# Patient Record
Sex: MALE
[De-identification: names, ages, dates, MRNs, and addresses within clinical notes are randomized per-mention and may not be internally consistent; named-entity substitution may affect disease eponyms.]

---

## 2018-01-31 ENCOUNTER — HOSPITAL ENCOUNTER (EMERGENCY)
Dept: HOSPITAL 41 - JD.ED | Age: 53
Discharge: HOME | End: 2018-01-31
Payer: COMMERCIAL

## 2018-01-31 DIAGNOSIS — I10: ICD-10-CM

## 2018-01-31 DIAGNOSIS — H33.21: Primary | ICD-10-CM

## 2018-01-31 DIAGNOSIS — E11.9: ICD-10-CM

## 2018-01-31 DIAGNOSIS — Z79.84: ICD-10-CM

## 2018-01-31 DIAGNOSIS — Z79.82: ICD-10-CM

## 2018-01-31 DIAGNOSIS — Z88.5: ICD-10-CM
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
## 2018-05-22 ENCOUNTER — HOSPITAL ENCOUNTER (OUTPATIENT)
Dept: HOSPITAL 41 - JD.SDS | Age: 53
Discharge: HOME | End: 2018-05-22
Attending: OPHTHALMOLOGY
Payer: COMMERCIAL

## 2018-05-22 DIAGNOSIS — H35.371: ICD-10-CM

## 2018-05-22 DIAGNOSIS — I10: ICD-10-CM

## 2018-05-22 DIAGNOSIS — E11.9: ICD-10-CM

## 2018-05-22 DIAGNOSIS — Z79.899: ICD-10-CM

## 2018-05-22 DIAGNOSIS — Z79.82: ICD-10-CM

## 2018-05-22 DIAGNOSIS — H25.813: Primary | ICD-10-CM

## 2018-05-22 DIAGNOSIS — H31.001: ICD-10-CM

## 2018-05-22 DIAGNOSIS — Z79.84: ICD-10-CM

## 2018-05-22 PROCEDURE — 66984 XCAPSL CTRC RMVL W/O ECP: CPT

## 2018-05-22 PROCEDURE — C1780 LENS, INTRAOCULAR (NEW TECH): HCPCS

## 2018-05-22 RX ADMIN — TETRACAINE HYDROCHLORIDE SCH DROP: 5 SOLUTION OPHTHALMIC at 09:34

## 2018-05-22 RX ADMIN — POLYMYXIN B SULFATE AND TRIMETHOPRIM SULFATE SCH DROP: 10000; 1 SOLUTION/ DROPS OPHTHALMIC at 09:16

## 2018-05-22 RX ADMIN — TROPICAMIDE SCH DROP: 10 SOLUTION/ DROPS OPHTHALMIC at 09:30

## 2018-05-22 RX ADMIN — TROPICAMIDE SCH DROP: 10 SOLUTION/ DROPS OPHTHALMIC at 08:47

## 2018-05-22 RX ADMIN — TROPICAMIDE SCH DROP: 10 SOLUTION/ DROPS OPHTHALMIC at 09:00

## 2018-05-22 RX ADMIN — POLYMYXIN B SULFATE AND TRIMETHOPRIM SULFATE SCH ML: 10000; 1 SOLUTION/ DROPS OPHTHALMIC at 10:05

## 2018-05-22 RX ADMIN — POLYMYXIN B SULFATE AND TRIMETHOPRIM SULFATE SCH DROP: 10000; 1 SOLUTION/ DROPS OPHTHALMIC at 08:31

## 2018-05-22 RX ADMIN — TROPICAMIDE SCH DROP: 10 SOLUTION/ DROPS OPHTHALMIC at 09:10

## 2018-05-22 RX ADMIN — TETRACAINE HYDROCHLORIDE SCH ML: 5 SOLUTION OPHTHALMIC at 09:59
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
## 2018-05-22 NOTE — [id number]
Post Anesthesia Note





- EVALUATION WITHIN 48HRS OF ANESTHETIC


Vital Signs in Normal Range: Yes


Patient Participated in Evaluation: Yes


Respiratory Function Stable: Yes


Airway Patent: Yes


Cardiovascular Function Stable: Yes


Hydration Status Stable: Yes


Pain Control Satisfactory: Yes


Nausea and Vomiting Control Satisfactory: Yes


Mental Status Recovered: Yes


Pulse Rate: 73


SaO2: 98


Resp Rate: 16


Blood Pressure: 157/96

## 2018-09-19 ENCOUNTER — HOSPITAL ENCOUNTER (EMERGENCY)
Dept: HOSPITAL 41 - JD.ED | Age: 53
Discharge: NURSING, REHAB OR LONG-TERM CARE | End: 2018-09-19
Payer: COMMERCIAL

## 2018-09-19 DIAGNOSIS — Z88.5: ICD-10-CM

## 2018-09-19 DIAGNOSIS — I21.4: Primary | ICD-10-CM

## 2018-09-19 DIAGNOSIS — E11.9: ICD-10-CM

## 2018-09-19 DIAGNOSIS — I10: ICD-10-CM

## 2018-09-19 PROCEDURE — 84484 ASSAY OF TROPONIN QUANT: CPT

## 2018-09-19 PROCEDURE — 85610 PROTHROMBIN TIME: CPT

## 2018-09-19 PROCEDURE — 80053 COMPREHEN METABOLIC PANEL: CPT

## 2018-09-19 PROCEDURE — 71045 X-RAY EXAM CHEST 1 VIEW: CPT

## 2018-09-19 PROCEDURE — 85730 THROMBOPLASTIN TIME PARTIAL: CPT

## 2018-09-19 PROCEDURE — 96368 THER/DIAG CONCURRENT INF: CPT

## 2018-09-19 PROCEDURE — 85025 COMPLETE CBC W/AUTO DIFF WBC: CPT

## 2018-09-19 PROCEDURE — 99285 EMERGENCY DEPT VISIT HI MDM: CPT

## 2018-09-19 PROCEDURE — 93005 ELECTROCARDIOGRAM TRACING: CPT

## 2018-09-19 PROCEDURE — 96365 THER/PROPH/DIAG IV INF INIT: CPT

## 2018-09-19 PROCEDURE — 36415 COLL VENOUS BLD VENIPUNCTURE: CPT

## 2018-09-19 NOTE — EDM.PDOC
ED HPI GENERAL MEDICAL PROBLEM





- General


Chief Complaint: Chest Pain


Stated Complaint: CHEST/ ARM/ NECK PAIN


Time Seen by Provider: 09/19/18 17:30


Source of Information: Reports: Patient


History Limitations: Reports: No Limitations





- History of Present Illness


INITIAL COMMENTS - FREE TEXT/NARRATIVE: 





53-year-old male presents for evaluation and treatment of sudden onset of chest 

pain. Patient reports he was at work. 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 

shelves. He states that he was bent over he felt sudden onset of chest pain 

with radiation into his left arm into his throat. He reports feeling nauseated 

and diaphoretic. This occurred about 45 minutes prior to arrival in the ED. He 

reports dizziness and lightheadedness associated with this episode. Upon 

arrival to the ED, He reports the chest pain is minimal as he is declining any 

pain medication at this time. He is actually more complaining of pain throat. 

He is reporting a globus sensation. He also reports a headache. He denies any 

vomiting or syncope.





Patient is a past medical history of diabetes, hypertension and obesity.





He reports he's never had a MI but he has had multiple stress test. Most recent 

stress test was about 3 years ago. He states he never had a coronary artery 

angiogram done.





Primary care provider is Flaca Velásquez. 


Onset: Today, Sudden


  ** Chest


Pain Score (Numeric/FACES): 2





  ** Left Arm


Pain Score (Numeric/FACES): 1





- Related Data


 Allergies











Allergy/AdvReac Type Severity Reaction Status Date / Time


 


morphine Allergy  Hives Verified 09/19/18 17:18











Home Meds: 


 Home Meds





Aspirin [Yelitza Chewable Aspirin] 81 mg PO DAILY 11/23/14 [History]


Lisinopril 60 tab PO DAILY 11/23/14 [History]


NIFEdipine [Afeditab CR] 60 mg PO DAILY 11/23/14 [History]


Metoprolol Succinate [Toprol XL 50mg] 25 mg PO BEDTIME 01/31/18 [History]


metFORMIN HCl [Metformin HCl] 500 mg PO DAILY 01/31/18 [History]


Biotin 5 mg PO DAILY 09/19/18 [History]


Cinnamon Bark [Cinnamon] 1,000 mg PO BID 09/19/18 [History]


Multivitamin [Multi-Vitamin Daily] 1 tab PO DAILY 09/19/18 [History]


Potassium 1 tab PO DAILY 09/19/18 [History]











Past Medical History


Cardiovascular History: Reports: High Cholesterol, Hypertension


Musculoskeletal History: Reports: Arthritis, Back Pain, Chronic, Other (See 

Below)


Other Musculoskeletal History: fractured collarbone


Psychiatric History: Reports: Anxiety


Endocrine/Metabolic History: Reports: Diabetes, Type II





- Past Surgical History


Respiratory Surgical History: Reports: Other (See Below)


Other Respiratory Surgeries/Procedures: left lung collapsed and had chest tubes





Social & Family History





- Tobacco Use


Smoking Status *Q: Never Smoker





- Caffeine Use


Caffeine Use: Reports: Coffee, Energy Drinks, Soda





- Recreational Drug Use


Recreational Drug Use: No





- Living Situation & Occupation


Living situation: Reports: 


Occupation: Employed





ED ROS GENERAL





- Review of Systems


Review Of Systems: See Below


HEENT: Reports: Throat Pain, Other (reports globus sensation)


Respiratory: Denies: Shortness of Breath, Cough


Cardiovascular: Reports: Chest Pain (central with radiation to the left arm), 

Lightheadedness.  Denies: Syncope


GI/Abdominal: Reports: Nausea.  Denies: Abdominal Pain, Vomiting


Musculoskeletal: Reports: Arm Pain (left).  Denies: Back Pain


Neurological: Reports: Dizziness, Headache.  Denies: Syncope





ED EXAM, GENERAL





- Physical Exam


Exam: See Below


Exam Limited By: No Limitations


General Appearance: Alert, WD/WN, No Apparent Distress, Obese


Eye Exam: Bilateral Eye: Normal Inspection


Nose: Normal Inspection


Throat/Mouth: Normal Inspection, Normal Lips, Normal Voice, No Airway Compromise


Respiratory/Chest: No Respiratory Distress, Lungs Clear, Normal Breath Sounds


Cardiovascular: Normal Peripheral Pulses, Regular Rate, Rhythm, No Murmur


GI/Abdominal: Soft, Non-Tender


Neurological: Alert, Oriented, Normal Cognition


Psychiatric: Normal Affect, Normal Mood


Skin Exam: Warm, Dry, Normal Color





EKG INTERPRETATION


EKG Date: 09/19/18


Time: 19:15


Rhythm: NSR


Rate (Beats/Min): 96


Axis: LAD-Left Axis Deviation


P-Wave: Present


QRS: Normal


ST-T: Normal


QT: Normal


EKG Interpretation Comments: 





EKG at 17:13


Normal sinus rhythm at 96 bpm. Poor R-wave progression with delayed transition. 

Consider left atrial hypertrophy. Near Q waves 3 and aVF. Consider old inferior 

wall MI. Left axis deviation at 37. Reviewed by myself and Dr. Butler.





EKG at 21:04


Normal sinus rhythm at 77 bpm. No acute ST segment depression or elevation. No 

significant change from EKG done at 17:13. Reviewed by myself and Dr. calloway. 





Course





- Vital Signs


Last Recorded V/S: 


 Last Vital Signs











Temp  97.8 F   09/19/18 17:16


 


Pulse  94   09/19/18 17:16


 


Resp  12   09/19/18 17:16


 


BP  177/106 H  09/19/18 17:16


 


Pulse Ox  96   09/19/18 17:16














- Orders/Labs/Meds


Orders: 


 Active Orders 24 hr











 Category Date Time Status


 


 Cardiac Monitoring [RC] .AS DIRECTED Care  09/19/18 17:30 Active


 


 EKG 12 Lead [EKG Documentation Completion] [RC] STAT Care  09/19/18 20:58 

Active


 


 EKG Documentation Completion [RC] ASDIRECTED Care  09/19/18 17:19 Active


 


 Peripheral IV Care [RC] .AS DIRECTED Care  09/19/18 17:30 Active


 


 Chest 1V Frontal [CR] Stat Exams  09/19/18 17:30 Taken


 


 Heparin Sodium/D5W [Heparin 25,000 Units in D5W 500 ML] Med  09/19/18 21:00 

Active





 25,000 units in 500 ml IV TITRATE   


 


 Nitroglycerin/D5W [Nitroglycerin  25 MG/D5W 250 ML] Med  09/19/18 21:00 Active





 25 mg in 250 ml IV TITRATE   


 


 Sodium Chloride 0.9% [Saline Flush] Med  09/19/18 17:30 Active





 10 ml FLUSH ASDIRECTED PRN   


 


 Peripheral IV Insertion Adult [OM.PC] Routine Oth  09/19/18 17:30 Ordered


 


 EKG 12 Lead [EK] Stat Ther  09/19/18 17:19 Ordered








 Medication Orders





Nitroglycerin/Dextrose (Nitroglycerin  25 Mg/D5w 250 Ml)  25 mg in 250 mls @ 3 

mls/hr IV TITRATE FORREST; Protocol


   Last Admin: 09/19/18 21:11  Dose: 5 mcg/min, 3 mls/hr


Heparin Sodium/Dextrose (Heparin 25,000 Units In D5w 500 Ml)  25,000 units in 

500 mls @ 20 mls/hr IV TITRATE FORREST


   Last Admin: 09/19/18 21:10  Dose: 1,000 units/hr, 20 mls/hr


Sodium Chloride (Saline Flush)  10 ml FLUSH ASDIRECTED PRN


   PRN Reason: Keep Vein Open


   Last Admin: 09/19/18 17:31  Dose: 10 ml








Labs: 


 Laboratory Tests











  09/19/18 09/19/18 09/19/18 Range/Units





  17:20 17:20 17:20 


 


WBC  8.00    (4.23-9.07)  K/mm3


 


RBC  4.93    (4.63-6.08)  M/mm3


 


Hgb  14.7    (13.7-17.5)  gm/L


 


Hct  43.5    (40.1-51.0)  %


 


MCV  88.2    (79.0-92.2)  fl


 


MCH  29.8    (25.7-32.2)  pg


 


MCHC  33.8    (32.2-35.5)  g/dl


 


RDW Std Deviation  42.7    (35.1-43.9)  fL


 


Plt Count  234    (163-337)  K/mm3


 


MPV  11.7    (9.4-12.3)  fl


 


Neut % (Auto)  51.3    (34.0-67.9)  %


 


Lymph % (Auto)  36.8    (21.8-53.1)  %


 


Mono % (Auto)  10.4    (5.3-12.2)  %


 


Eos % (Auto)  0.8    (0.8-7.0)  


 


Baso % (Auto)  0.3    (0.1-1.2)  %


 


Neut # (Auto)  4.12    (1.78-5.38)  K/mm3


 


Lymph # (Auto)  2.94    (1.32-3.57)  K/mm3


 


Mono # (Auto)  0.83 H    (0.30-0.82)  K/mm3


 


Eos # (Auto)  0.06    (0.04-0.54)  K/mm3


 


Baso # (Auto)  0.02    (0.01-0.08)  K/mm3


 


PT   9.8   (9.5-12.1)  SECONDS


 


INR   < 0.93   


 


APTT   26   (24-31)  SECONDS


 


Sodium    140  (136-145)  mEq/L


 


Potassium    3.5  (3.5-5.1)  mEq/L


 


Chloride    107  ()  mEq/L


 


Carbon Dioxide    27  (21-32)  mEq/L


 


Anion Gap    9.5  (5-15)  


 


BUN    18  (7-18)  mg/dL


 


Creatinine    1.4 H  (0.7-1.3)  mg/dL


 


Est Cr Clr Drug Dosing    69.32  mL/min


 


Estimated GFR (MDRD)    53  (>60)  mL/min


 


BUN/Creatinine Ratio    12.9 L  (14-18)  


 


Glucose    151 H  ()  mg/dL


 


Calcium    9.8  (8.5-10.1)  mg/dL


 


Total Bilirubin    0.3  (0.2-1.0)  mg/dL


 


AST    16  (15-37)  U/L


 


ALT    31  (16-63)  U/L


 


Alkaline Phosphatase    100  ()  U/L


 


Troponin I    < 0.017  (0.00-0.056)  ng/mL


 


Total Protein    8.3 H  (6.4-8.2)  g/dl


 


Albumin    4.3  (3.4-5.0)  g/dl


 


Globulin    4.0  gm/dL


 


Albumin/Globulin Ratio    1.1  (1-2)  














  09/19/18 Range/Units





  20:23 


 


WBC   (4.23-9.07)  K/mm3


 


RBC   (4.63-6.08)  M/mm3


 


Hgb   (13.7-17.5)  gm/L


 


Hct   (40.1-51.0)  %


 


MCV   (79.0-92.2)  fl


 


MCH   (25.7-32.2)  pg


 


MCHC   (32.2-35.5)  g/dl


 


RDW Std Deviation   (35.1-43.9)  fL


 


Plt Count   (163-337)  K/mm3


 


MPV   (9.4-12.3)  fl


 


Neut % (Auto)   (34.0-67.9)  %


 


Lymph % (Auto)   (21.8-53.1)  %


 


Mono % (Auto)   (5.3-12.2)  %


 


Eos % (Auto)   (0.8-7.0)  


 


Baso % (Auto)   (0.1-1.2)  %


 


Neut # (Auto)   (1.78-5.38)  K/mm3


 


Lymph # (Auto)   (1.32-3.57)  K/mm3


 


Mono # (Auto)   (0.30-0.82)  K/mm3


 


Eos # (Auto)   (0.04-0.54)  K/mm3


 


Baso # (Auto)   (0.01-0.08)  K/mm3


 


PT   (9.5-12.1)  SECONDS


 


INR   


 


APTT   (24-31)  SECONDS


 


Sodium   (136-145)  mEq/L


 


Potassium   (3.5-5.1)  mEq/L


 


Chloride   ()  mEq/L


 


Carbon Dioxide   (21-32)  mEq/L


 


Anion Gap   (5-15)  


 


BUN   (7-18)  mg/dL


 


Creatinine   (0.7-1.3)  mg/dL


 


Est Cr Clr Drug Dosing   mL/min


 


Estimated GFR (MDRD)   (>60)  mL/min


 


BUN/Creatinine Ratio   (14-18)  


 


Glucose   ()  mg/dL


 


Calcium   (8.5-10.1)  mg/dL


 


Total Bilirubin   (0.2-1.0)  mg/dL


 


AST   (15-37)  U/L


 


ALT   (16-63)  U/L


 


Alkaline Phosphatase   ()  U/L


 


Troponin I  0.231 H*  (0.00-0.056)  ng/mL


 


Total Protein   (6.4-8.2)  g/dl


 


Albumin   (3.4-5.0)  g/dl


 


Globulin   gm/dL


 


Albumin/Globulin Ratio   (1-2)  











Meds: 


Medications











Generic Name Dose Route Start Last Admin





  Trade Name Freq  PRN Reason Stop Dose Admin


 


Nitroglycerin/Dextrose  25 mg in 250 mls @ 3 mls/hr  09/19/18 21:00  09/19/18 21

:11





  Nitroglycerin  25 Mg/D5w 250 Ml  IV   5 mcg/min





  TITRATE FORREST   3 mls/hr





     Administration





     





  Protocol   





  5 MCG/MIN   


 


Heparin Sodium/Dextrose  25,000 units in 500 mls @ 20 mls/hr  09/19/18 21:00  09 /19/18 21:10





  Heparin 25,000 Units In D5w 500 Ml  IV   1,000 units/hr





  TITRATE FORREST   20 mls/hr





     Administration





     





     





  1,000 UNITS/HR   


 


Sodium Chloride  10 ml  09/19/18 17:30  09/19/18 17:31





  Saline Flush  FLUSH   10 ml





  ASDIRECTED PRN   Administration





  Keep Vein Open   





     





     





     














Discontinued Medications














Generic Name Dose Route Start Last Admin





  Trade Name Freq  PRN Reason Stop Dose Admin


 


Aspirin  324 mg  09/19/18 17:31  09/19/18 17:38





  Aspirin  PO  09/19/18 17:32  324 mg





  ONETIME ONE   Administration





     





     





     





     


 


Al Hydroxide/Mg Hydroxide 30  0 ml  09/19/18 19:06  09/19/18 19:18





  ml/ Lidocaine HCl 15 ml  PO  09/19/18 19:07  45 ml





  ONETIME ONE   Administration





     





     





     





     


 


Heparin Sodium (Porcine)  4,000 units  09/19/18 21:00  09/19/18 21:10





  Heparin Sodium  IVPUSH  09/19/18 21:01  4,000 units





  ONETIME ONE   Administration





     





     





     





     


 


Metoprolol Tartrate  5 mg  09/19/18 17:31  09/19/18 19:20





  Lopressor  IVPUSH  09/19/18 17:32  Not Given





  ONETIME ONE   





     





     





     





     


 


Metoprolol Tartrate  50 mg  09/19/18 21:14  





  Lopressor  PO  09/19/18 21:15  





  ONETIME ONE   





     





     





     





     














- Radiology Interpretation


Free Text/Narrative:: 





Chest x-ray reviewed by myself and Dr. Butler shows no acute intrathoracic 

process.





- Re-Assessments/Exams


Free Text/Narrative Re-Assessment/Exam: 





09/19/18 19:06


Reviewed the labs, EKG and imaging with the patient. He has been essentially 

chest pain free other than some discomfort to his throat. He has declined pain 

medication. Will attempt to try GI cocktail as GERD is a consideration.





Plan will be to repeat the trop at 3 hours. May consider admission for chest 

pain rule out due to suspicious history.





09/19/18 21:27


Pain reported good pain relief with the GI cocktail and decline further pain 

medication.





His repeat troponin has come back elevation at 0.236. He does appear to be 

having a non-STEMI. Recommend transfer to [place] for further management and 

care.





I discussed this with the patient. He is agreeable to going to [facility] in 

[place].





Heparin bolus, heparin drip and nitro drip started. Patient states that the 

discomfort is starting to come back. He was offered pain medication but again 

declined. Nitro drip May provide some relief. Patient received a 325 mg aspirin 

upon arrival to the ED. His oxygen sats have been in the mid-upper 90s and has 

not required oxygen.





Case discussed with Dr. Humphrey, ER physician. Accepts the patient. He will go by 

ground EMS to [facility] in [place]  Dr. Humphrey accepting. Asked we give 50mg 

PO lopressor.








Departure





- Departure


Time of Disposition: 21:30


Disposition: DC/Tfer to Acute Hospital [facility]


Reason for Transfer *Q: Other


Condition: Fair


Clinical Impression: 


 NSTEMI (non-ST elevated myocardial infarction)





Referrals: 


Gerri Healy PA-C [Primary Care Provider] - 


Forms:  ED Department Discharge


Additional Instructions: 


Patient to be transferred to [facility] in [place]. Dr. Humphrey, ER physician 

excepting. He'll but go by ground ambulance. Heparin bolus given. 325 mg 

aspirin given. He is currently receiving a heparin drip and nitro drip. Has 

declined pain medication. Oxygen saturation has been in the mid to upper 90s 

has not required oxygen. 50 mg by mouth Lopressor given.





- My Orders


Last 24 Hours: 


My Active Orders





09/19/18 17:19


EKG Documentation Completion [RC] ASDIRECTED 


EKG 12 Lead [EK] Stat 





09/19/18 17:30


Cardiac Monitoring [RC] .AS DIRECTED 


Peripheral IV Care [RC] .AS DIRECTED 


Chest 1V Frontal [CR] Stat 


Sodium Chloride 0.9% [Saline Flush]   10 ml FLUSH ASDIRECTED PRN 


Peripheral IV Insertion Adult [OM.PC] Routine 





09/19/18 20:58


EKG 12 Lead [EKG Documentation Completion] [RC] STAT 





09/19/18 21:00


Heparin Sodium/D5W [Heparin 25,000 Units in D5W 500 ML] 25,000 units in 500 ml 

IV TITRATE 


Nitroglycerin/D5W [Nitroglycerin  25 MG/D5W 250 ML] 25 mg in 250 ml IV TITRATE 














- Assessment/Plan


Last 24 Hours: 


My Active Orders





09/19/18 17:19


EKG Documentation Completion [RC] ASDIRECTED 


EKG 12 Lead [EK] Stat 





09/19/18 17:30


Cardiac Monitoring [RC] .AS DIRECTED 


Peripheral IV Care [RC] .AS DIRECTED 


Chest 1V Frontal [CR] Stat 


Sodium Chloride 0.9% [Saline Flush]   10 ml FLUSH ASDIRECTED PRN 


Peripheral IV Insertion Adult [OM.PC] Routine 





09/19/18 20:58


EKG 12 Lead [EKG Documentation Completion] [RC] STAT 





09/19/18 21:00


Heparin Sodium/D5W [Heparin 25,000 Units in D5W 500 ML] 25,000 units in 500 ml 

IV TITRATE 


Nitroglycerin/D5W [Nitroglycerin  25 MG/D5W 250 ML] 25 mg in 250 ml IV TITRATE

## 2018-09-20 NOTE — CR
Chest: Portable view of the chest was obtained.

 

Comparison: Prior chest x-ray of 10/07/16.

 

Heart size is normal.  Tortuous thoracic aorta is seen.  Lungs are 

clear without acute parenchymal change.  Bony structures are grossly 

intact.

 

Impression:

1.  Nothing acute is seen on portable chest x-ray.

 

Diagnostic code #2